# Patient Record
Sex: MALE | Race: AMERICAN INDIAN OR ALASKA NATIVE
[De-identification: names, ages, dates, MRNs, and addresses within clinical notes are randomized per-mention and may not be internally consistent; named-entity substitution may affect disease eponyms.]

---

## 2019-11-19 ENCOUNTER — HOSPITAL ENCOUNTER (EMERGENCY)
Dept: HOSPITAL 5 - ED | Age: 29
Discharge: HOME | End: 2019-11-19
Payer: SELF-PAY

## 2019-11-19 VITALS — SYSTOLIC BLOOD PRESSURE: 118 MMHG | DIASTOLIC BLOOD PRESSURE: 69 MMHG

## 2019-11-19 DIAGNOSIS — M94.0: Primary | ICD-10-CM

## 2019-11-19 DIAGNOSIS — J45.909: ICD-10-CM

## 2019-11-19 DIAGNOSIS — R51: ICD-10-CM

## 2019-11-19 DIAGNOSIS — Z91.041: ICD-10-CM

## 2019-11-19 DIAGNOSIS — R20.2: ICD-10-CM

## 2019-11-19 DIAGNOSIS — Z79.899: ICD-10-CM

## 2019-11-19 DIAGNOSIS — F17.200: ICD-10-CM

## 2019-11-19 LAB
ALBUMIN SERPL-MCNC: 4.5 G/DL (ref 3.9–5)
ALT SERPL-CCNC: 13 UNITS/L (ref 7–56)
BASOPHILS # (AUTO): 0 K/MM3 (ref 0–0.1)
BASOPHILS NFR BLD AUTO: 0.6 % (ref 0–1.8)
BILIRUB DIRECT SERPL-MCNC: < 0.2 MG/DL (ref 0–0.2)
BUN SERPL-MCNC: 9 MG/DL (ref 9–20)
BUN/CREAT SERPL: 11 %
CALCIUM SERPL-MCNC: 9.1 MG/DL (ref 8.4–10.2)
EOSINOPHIL # BLD AUTO: 0 K/MM3 (ref 0–0.4)
EOSINOPHIL NFR BLD AUTO: 0.6 % (ref 0–4.3)
HCT VFR BLD CALC: 41.1 % (ref 35.5–45.6)
HEMOLYSIS INDEX: 4
HGB BLD-MCNC: 13.3 GM/DL (ref 11.8–15.2)
LYMPHOCYTES # BLD AUTO: 1.6 K/MM3 (ref 1.2–5.4)
LYMPHOCYTES NFR BLD AUTO: 26.4 % (ref 13.4–35)
MCHC RBC AUTO-ENTMCNC: 32 % (ref 32–34)
MCV RBC AUTO: 82 FL (ref 84–94)
MONOCYTES # (AUTO): 0.4 K/MM3 (ref 0–0.8)
MONOCYTES % (AUTO): 6 % (ref 0–7.3)
PLATELET # BLD: 216 K/MM3 (ref 140–440)
RBC # BLD AUTO: 5.03 M/MM3 (ref 3.65–5.03)

## 2019-11-19 PROCEDURE — 80076 HEPATIC FUNCTION PANEL: CPT

## 2019-11-19 PROCEDURE — 85025 COMPLETE CBC W/AUTO DIFF WBC: CPT

## 2019-11-19 PROCEDURE — 84484 ASSAY OF TROPONIN QUANT: CPT

## 2019-11-19 PROCEDURE — 99284 EMERGENCY DEPT VISIT MOD MDM: CPT

## 2019-11-19 PROCEDURE — 70450 CT HEAD/BRAIN W/O DYE: CPT

## 2019-11-19 PROCEDURE — 96375 TX/PRO/DX INJ NEW DRUG ADDON: CPT

## 2019-11-19 PROCEDURE — 80048 BASIC METABOLIC PNL TOTAL CA: CPT

## 2019-11-19 PROCEDURE — 36415 COLL VENOUS BLD VENIPUNCTURE: CPT

## 2019-11-19 PROCEDURE — 93005 ELECTROCARDIOGRAM TRACING: CPT

## 2019-11-19 PROCEDURE — 71046 X-RAY EXAM CHEST 2 VIEWS: CPT

## 2019-11-19 PROCEDURE — 93010 ELECTROCARDIOGRAM REPORT: CPT

## 2019-11-19 PROCEDURE — 96374 THER/PROPH/DIAG INJ IV PUSH: CPT

## 2019-11-19 NOTE — CAT SCAN REPORT
CT head without contrast



INDICATION : severe headache, left pronator drift.



TECHNIQUE:  Axial imaging performed from the skull apex through the skull base without the use of con
trast.  All CT scans at this location are performed using CT dose reduction for ALARA by means of aut
omated exposure control. 



COMPARISON:  None



FINDINGS:  



Parenchyma:  No acute intracranial hemorrhage or parenchymal abnormality.

Ventricles:  Ventricles are normal in size and appear symmetric.   

Soft tissues:  Soft tissues including the orbits appear normal.   

Bones:  No acute osseous abnormality.   

Sinuses:  Sinuses and mastoid air cells are clear.





IMPRESSION: No acute abnormality.



Signer Name: Senthil Gibbons MD 

Signed: 11/19/2019 4:38 PM

 Workstation Name: [id number]

## 2019-11-19 NOTE — EVENT NOTE
ED Screening Note


Date of service: 11/19/19


Time: 12:40


ED Screening Note: 


27 y/o male comes in for chest pain and headache. times 4 days. + smoker





This initial assessment/diagnostic orders/clinical plan/treatment(s) is/are 

subject to change based on patients health status, clinical progression and re-

assessment by fellow clinical providers in the ED. Further treatment and workup 

at subsequent clinical providers discretion. Patient/guardian urged not to elope

from the ED as their condition may be serious if not clinically assessed and 

managed. 





Initial orders include:

## 2019-11-19 NOTE — XRAY REPORT
CHEST 2 VIEWS 



INDICATION / CLINICAL INFORMATION:

chest pain.



COMPARISON: 

None available.



FINDINGS:



SUPPORT DEVICES: None.



HEART / MEDIASTINUM: No significant abnormality. 



LUNGS / PLEURA: No significant pulmonary or pleural abnormality. No pneumothorax. 



ADDITIONAL FINDINGS: No significant additional findings.



IMPRESSION:

1. No acute findings.



Signer Name: Pedro Pablo Boyce MD 

Signed: 11/19/2019 1:03 PM

 Workstation Name: [id number]

## 2019-11-19 NOTE — EMERGENCY DEPARTMENT REPORT
ED General Adult HPI





- General


Chief complaint: Headache


Stated complaint: CHEST PAIN/NOSE BLEED


Time Seen by Provider: 11/19/19 12:40


Source: patient


Mode of arrival: Ambulatory


Limitations: No Limitations





- History of Present Illness


Initial comments: 





28-year-old -American male patient without significant past medical 

history presents with complaints of headache, left sided chest pain, bilateral 

tingling in his lower legs and feet for the past 4 days.  He denies any head 

injury, dizziness, weakness, back/neck pain, fever, shortness of breath, or 

injuries.  He states ibuprofen is not helping with his chest or head pain.  

Patient reports he had one similar episode of a headache lasting multiple days a

couple of years ago when he was very stressed.  He states that he is currently 

very stressed due to issues with his girlfriend and he has not been eating or 

drinking much.  He admits to intermittent blurry vision, but denies any currentl

y.  He rates the headache as a 10/10 in severity in his chest pain as a 9/10 in 

severity.  Patient states the chest pain feels like a stabbing sensation that 

only occurs with deep inspiration.  He admits to heavy lifting of boxes at work 

regularly.  Also admits to history of asthma as a child.  Patient is a smoker 

also.


-: Sudden





- Related Data


                                  Previous Rx's











 Medication  Instructions  Recorded  Last Taken  Type


 


Ibuprofen [Motrin] 600 mg PO Q8H PRN #30 tablet 12/30/18 Unknown Rx


 


traMADoL [Ultram 50 MG tab] 50 mg PO Q6HR PRN #20 tablet 12/30/18 Unknown Rx


 


Prednisone [predniSONE 5 mg (6-Day 5 mg PO .TAPER #1 tab.ds.pk 11/19/19 Unknown 

Rx





Pack, 21 Tabs)]    











                                    Allergies











Allergy/AdvReac Type Severity Reaction Status Date / Time


 


shellfish derived Allergy  Swelling Verified 11/19/19 12:29


 


iodine AdvReac  Rash Verified 12/30/18 03:23














ED Review of Systems


ROS: 


Stated complaint: CHEST PAIN/NOSE BLEED


Other details as noted in HPI





Constitutional: denies: chills, diaphoresis, fever, malaise, weakness


Eyes: vision change.  denies: eye pain, eye discharge


ENT: denies: throat pain, hearing loss, congestion


Respiratory: denies: cough, orthopnea, shortness of breath, SOB with exertion, 

SOB at rest


Cardiovascular: chest pain.  denies: palpitations, dyspnea on exertion, edema, 

syncope


Endocrine: no symptoms reported


Gastrointestinal: denies: abdominal pain, nausea, vomiting


Genitourinary: denies: dysuria, frequency


Musculoskeletal: denies: back pain, joint swelling, arthralgia


Neurological: headache, paresthesias.  denies: weakness, confusion, abnormal 

gait


Psychiatric: denies: anxiety, depression


Hematological/Lymphatic: denies: easy bleeding, easy bruising





ED Past Medical Hx





- Past Medical History


Previous Medical History?: Yes


Hx Asthma: Yes





- Surgical History


Past Surgical History?: No





- Social History


Smoking Status: Current Every Day Smoker


Substance Use Type: None





- Medications


Home Medications: 


                                Home Medications











 Medication  Instructions  Recorded  Confirmed  Last Taken  Type


 


Ibuprofen [Motrin] 600 mg PO Q8H PRN #30 tablet 12/30/18  Unknown Rx


 


traMADoL [Ultram 50 MG tab] 50 mg PO Q6HR PRN #20 tablet 12/30/18  Unknown Rx


 


Prednisone [predniSONE 5 mg (6-Day 5 mg PO .TAPER #1 tab.ds.pk 11/19/19  Unknown

 Rx





Pack, 21 Tabs)]     














ED Physical Exam





- General


Limitations: No Limitations


General appearance: alert, in no apparent distress





- Head


Head exam: Present: atraumatic





- Eye


Eye exam: Present: normal appearance, PERRL, EOMI.  Absent: scleral icterus, 

conjunctival injection





- ENT


ENT exam: Present: mucous membranes moist





- Neck


Neck exam: Present: normal inspection, full ROM.  Absent: tenderness





- Respiratory


Respiratory exam: Present: normal lung sounds bilaterally, chest wall tenderness

(left sided and as noted on palpation).  Absent: respiratory distress





- Cardiovascular


Cardiovascular Exam: Present: regular rate, normal rhythm.  Absent: systolic 

murmur, diastolic murmur, rubs, gallop





- GI/Abdominal


GI/Abdominal exam: Present: soft, normal bowel sounds.  Absent: distended, 

tenderness





- Rectal


Rectal exam: Present: deferred





- Extremities Exam


Extremities exam: Present: normal inspection, normal capillary refill.  Absent: 

pedal edema, joint swelling, calf tenderness





- Back Exam


Back exam: Present: full ROM.  Absent: paraspinal tenderness, vertebral 

tenderness





- Neurological Exam


Neurological exam: Present: alert, oriented X3, CN II-XII intact, normal gait.  

Absent: motor sensory deficit





- Expanded Neurological Exam


  ** Expanded


Cerebellar function: Finger to Nose: Normal, Heel to Shin: Normal, Romberg: 

Normal


Upper motor neuron: Pronator Drift: Abnormal Left (mild drop of the left arm 

noted, however pt states it hurts his chest to hold up his left arm)


Sensory exam: Upper Extremity Light Touch: Normal, Lower Extremity Light Touch: 

Normal


Motor strength exam: RUE: 5, LUE: 5, RLE: 5, LLE: 5


DTR: knee (R): 2+, knee (L): 2+





- Psychiatric


Psychiatric exam: Present: normal affect, normal mood





- Skin


Skin exam: Present: warm, dry, intact, normal color.  Absent: rash





ED Course


                                   Vital Signs











  11/19/19 11/19/19 11/19/19





  12:30 15:51 18:13


 


Temperature 97.6 F  98.4 F


 


Pulse Rate 83  81


 


Respiratory 19 16 17





Rate   


 


Blood Pressure 122/76  


 


Blood Pressure   118/69





[Left]   


 


O2 Sat by Pulse 100  98





Oximetry   














ED Medical Decision Making





- Lab Data


Result diagrams: 


                                 11/19/19 12:46





                                 11/19/19 12:46








                                   Lab Results











  11/19/19 11/19/19 Range/Units





  12:46 12:46 


 


WBC  5.9   (4.5-11.0)  K/mm3


 


RBC  5.03   (3.65-5.03)  M/mm3


 


Hgb  13.3   (11.8-15.2)  gm/dl


 


Hct  41.1   (35.5-45.6)  %


 


MCV  82 L   (84-94)  fl


 


MCH  26 L   (28-32)  pg


 


MCHC  32   (32-34)  %


 


RDW  14.8   (13.2-15.2)  %


 


Plt Count  216   (140-440)  K/mm3


 


Lymph % (Auto)  26.4   (13.4-35.0)  %


 


Mono % (Auto)  6.0   (0.0-7.3)  %


 


Eos % (Auto)  0.6   (0.0-4.3)  %


 


Baso % (Auto)  0.6   (0.0-1.8)  %


 


Lymph #  1.6   (1.2-5.4)  K/mm3


 


Mono #  0.4   (0.0-0.8)  K/mm3


 


Eos #  0.0   (0.0-0.4)  K/mm3


 


Baso #  0.0   (0.0-0.1)  K/mm3


 


Seg Neutrophils %  66.4   (40.0-70.0)  %


 


Seg Neutrophils #  3.9   (1.8-7.7)  K/mm3


 


Sodium   140  (137-145)  mmol/L


 


Potassium   3.4 L  (3.6-5.0)  mmol/L


 


Chloride   102.1  ()  mmol/L


 


Carbon Dioxide   24  (22-30)  mmol/L


 


Anion Gap   17  mmol/L


 


BUN   9  (9-20)  mg/dL


 


Creatinine   0.8  (0.8-1.5)  mg/dL


 


Estimated GFR   > 60  ml/min


 


BUN/Creatinine Ratio   11  %


 


Glucose   81  ()  mg/dL


 


Calcium   9.1  (8.4-10.2)  mg/dL


 


Total Bilirubin   0.40  (0.1-1.2)  mg/dL


 


Direct Bilirubin   < 0.2  (0-0.2)  mg/dL


 


Indirect Bilirubin   0.2  mg/dL


 


AST   23  (5-40)  units/L


 


ALT   13  (7-56)  units/L


 


Alkaline Phosphatase   42  ()  units/L


 


Troponin T   < 0.010  (0.00-0.029)  ng/mL


 


Total Protein   7.8  (6.3-8.2)  g/dL


 


Albumin   4.5  (3.9-5)  g/dL


 


Albumin/Globulin Ratio   1.4  %














- Radiology Data


Radiology results: report reviewed





CHEST 2 VIEWS





INDICATION / CLINICAL INFORMATION:


chest pain.





COMPARISON:


None available.


FINDINGS:





SUPPORT DEVICES: None.





HEART / MEDIASTINUM: No significant abnormality.





LUNGS / PLEURA: No significant pulmonary or pleural abnormality. No 

pneumothorax.





ADDITIONAL FINDINGS: No significant additional findings.





IMPRESSION:


1. No acute findings.























CT head without contrast





INDICATION : severe headache, left pronator drift.





TECHNIQUE: Axial imaging performed from the skull apex through the skull base 

without the use of


contrast. All CT scans at this location are performed using CT dose reduction 

for ALARA by means of


automated exposure control.





COMPARISON: None





FINDINGS:





Parenchyma: No acute intracranial hemorrhage or parenchymal abnormality.


Ventricles: Ventricles are normal in size and appear symmetric.


Soft tissues: Soft tissues including the orbits appear normal.


Bones: No acute osseous abnormality.


Sinuses: Sinuses and mastoid air cells are clear.








IMPRESSION: No acute abnormality.





- Medical Decision Making





28-year-old male patient without significant past medical history here today 

with complaint of headache and chest pain for 4 days.  Patient also complains of

 bilateral lower leg.  He denies any back pain, loss of bladder/bowel control, 

hematochezia/anterior.  Labs are WNL.  Chest x-ray and CT head is WNL.  Mild 

left arm drift noted on initial exam.  After Decadron, Reglan, Benadryl, and 

saline bolus patient's symptoms have resolved.  No further drift is noted on 

exam.  Patient states his headache and chest pain is completely resolved.  

Suspect costochondritis.  Nico patient follow-up with neurology concerning 

headaches.  Discussed strict return precautions in great detail with patient who

 states understanding.


Critical care attestation.: 


If time is entered above; I have spent that time in minutes in the direct care 

of this critically ill patient, excluding procedure time.








ED Disposition


Clinical Impression: 


 Headache syndrome, Costochondritis, Bilateral leg paresthesia





Disposition: DC-01 TO HOME OR SELFCARE


Is pt being admited?: No


Condition: Stable


Instructions:  Migraine Headache (ED), Costochondritis (ED)


Prescriptions: 


Prednisone [predniSONE 5 mg (6-Day Pack, 21 Tabs)] 5 mg PO .TAPER #1 tab.ds.pk


Referrals: 


MELLISSA PAN MD [Referring] - 3-5 Days